# Patient Record
Sex: FEMALE | Race: WHITE | URBAN - METROPOLITAN AREA
[De-identification: names, ages, dates, MRNs, and addresses within clinical notes are randomized per-mention and may not be internally consistent; named-entity substitution may affect disease eponyms.]

---

## 2022-06-06 ENCOUNTER — OFFICE VISIT (OUTPATIENT)
Dept: URBAN - METROPOLITAN AREA CLINIC 27 | Facility: CLINIC | Age: 87
End: 2022-06-06
Payer: COMMERCIAL

## 2022-06-06 DIAGNOSIS — H43.813 VITREOUS DEGENERATION, BILATERAL: ICD-10-CM

## 2022-06-06 DIAGNOSIS — Z96.1 PRESENCE OF INTRAOCULAR LENS: ICD-10-CM

## 2022-06-06 DIAGNOSIS — H35.373 PUCKERING OF MACULA, BILATERAL: ICD-10-CM

## 2022-06-06 DIAGNOSIS — H35.3133 BILATERAL NONEXUDATIVE AGE-RELATED MACULAR DEGENERATION, ADVANCED ATROPHIC W/O SUBFOVEAL INVOLVEMENT: Primary | ICD-10-CM

## 2022-06-06 PROCEDURE — 92134 CPTRZ OPH DX IMG PST SGM RTA: CPT | Performed by: OPHTHALMOLOGY

## 2022-06-06 PROCEDURE — 99204 OFFICE O/P NEW MOD 45 MIN: CPT | Performed by: OPHTHALMOLOGY

## 2022-06-06 ASSESSMENT — INTRAOCULAR PRESSURE
OS: 17
OD: 16

## 2022-06-06 NOTE — IMPRESSION/PLAN
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 Reviewed RBA of obs vs PPV; given underlying GA, recommend obs.

## 2022-06-06 NOTE — IMPRESSION/PLAN
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  Cont AREDS/AG; RTC immediately prn dec [facility], inc Sxs.

6 months, photos/FA/ICG/OCT OU